# Patient Record
Sex: FEMALE | URBAN - METROPOLITAN AREA
[De-identification: names, ages, dates, MRNs, and addresses within clinical notes are randomized per-mention and may not be internally consistent; named-entity substitution may affect disease eponyms.]

---

## 2021-05-30 ENCOUNTER — RECORDS - HEALTHEAST (OUTPATIENT)
Dept: ADMINISTRATIVE | Facility: CLINIC | Age: 61
End: 2021-05-30

## 2021-05-31 ENCOUNTER — RECORDS - HEALTHEAST (OUTPATIENT)
Dept: ADMINISTRATIVE | Facility: CLINIC | Age: 61
End: 2021-05-31

## 2024-12-16 ENCOUNTER — TRANSCRIBE ORDERS (OUTPATIENT)
Dept: OTHER | Age: 64
End: 2024-12-16

## 2024-12-16 DIAGNOSIS — G89.29 CHRONIC PAIN OF LEFT UPPER EXTREMITY: Primary | ICD-10-CM

## 2024-12-16 DIAGNOSIS — M79.602 CHRONIC PAIN OF LEFT UPPER EXTREMITY: Primary | ICD-10-CM

## 2024-12-17 ENCOUNTER — APPOINTMENT (OUTPATIENT)
Dept: INTERPRETER SERVICES | Facility: CLINIC | Age: 64
End: 2024-12-17
Payer: COMMERCIAL

## 2024-12-19 ENCOUNTER — THERAPY VISIT (OUTPATIENT)
Dept: PHYSICAL THERAPY | Facility: REHABILITATION | Age: 64
End: 2024-12-19
Payer: COMMERCIAL

## 2024-12-19 DIAGNOSIS — M25.512 ACUTE PAIN OF LEFT SHOULDER: Primary | ICD-10-CM

## 2024-12-19 DIAGNOSIS — M79.602 CHRONIC PAIN OF LEFT UPPER EXTREMITY: ICD-10-CM

## 2024-12-19 DIAGNOSIS — G89.29 CHRONIC PAIN OF LEFT UPPER EXTREMITY: ICD-10-CM

## 2024-12-19 ASSESSMENT — ACTIVITIES OF DAILY LIVING (ADL)
AT_ITS_WORST?: 7
REMOVING_SOMETHING_FROM_YOUR_BACK_POCKET: 3
PUTTING_ON_A_SHIRT_THAT_BUTTONS_DOWN_THE_FRONT: 6
PUTTING_ON_YOUR_PANTS: 0
CARRYING_A_HEAVY_OBJECT_OF_10_POUNDS: 7
TOUCHING_THE_BACK_OF_YOUR_NECK: 8
PLEASE_INDICATE_YOR_PRIMARY_REASON_FOR_REFERRAL_TO_THERAPY:: SHOULDER
WASHING_YOUR_HAIR?: 0
WASHING_YOUR_BACK: 4
PUSHING_WITH_THE_INVOLVED_ARM: 4
REACHING_FOR_SOMETHING_ON_A_HIGH_SHELF: 8
WHEN_LYING_ON_THE_INVOLVED_SIDE: 8
PUTTING_ON_AN_UNDERSHIRT_OR_A_PULLOVER_SWEATER: 6
PLACING_AN_OBJECT_ON_A_HIGH_SHELF: 7

## 2024-12-19 NOTE — PROGRESS NOTES
PHYSICAL THERAPY EVALUATION  Type of Visit: Evaluation       Fall Risk Screen:  Have you fallen 2 or more times in the past year?: (Patient-Rptd) No  Have you fallen and had an injury in the past year?: (Patient-Rptd) No  Is patient a fall risk?: No    Subjective         Presenting condition or subjective complaint: (Patient-Rptd) shoulder  Date of onset: 26    Relevant medical history:     Dates & types of surgery: (Patient-Rptd) cyst in groin and breast 2005    Prior diagnostic imaging/testing results:       Prior therapy history for the same diagnosis, illness or injury: (Patient-Rptd) Yes      Patient reports that her pain has been there for a while. Sometimes, it would feel better. 1.5 months ago, her pain started to get worse and worse. She was prescribed medications and a gel. The pills help with pain, but the pain is still there.  Pain is in the shoulder and down and into her chest when she moves her shoulder. She has not been working the last 3 months. In [place], she was cleaning houses for work. She was also working at [employer].    Prior Level of Function  Transfers: Independent  Ambulation: Independent  ADL: Independent  IADL: Driving, Finances, Housekeeping, Laundry, Meal preparation, Medication management    Living Environment  Social support: (Patient-Rptd) With family members   Type of home: (Patient-Rptd) House   Stairs to enter the home:         Ramp: (Patient-Rptd) Yes   Stairs inside the home: (Patient-Rptd) No       Help at home: (Patient-Rptd) None  Equipment owned:       Employment: (Patient-Rptd) No    Hobbies/Interests: (Patient-Rptd) cook    Patient goals for therapy: (Patient-Rptd) recouperation    Pain assessment: Pain present  Location: L shoulder/Ratin/10     Objective   SHOULDER EVALUATION  PAIN: Pain Level at Rest: 4/10  Pain Level with Use: 7/10  Pain Location: shoulder and left side  Pain Quality: bothers her to not be able to move her arm  Pain Frequency: constant or  variable levels of pain  Pain is Worst: nighttime  Pain is Exacerbated By: Lying on her L side, lifting her arm, carrying/lifting objects if arm is twisted  Pain is Relieved By: NSAIDs, otc medications, and rest  Pain Progression: Worsened  POSTURE: Sitting Posture: Rounded shoulders, Forward head, Lordosis increased, Thoracic kyphosis decreased  ROM:   (Degrees) Left AROM Left PROM Right AROM  Right PROM   Shoulder Flexion 147 deg with pain  WNL    Shoulder Extension       Shoulder Abduction Pain to 90 degrees seated, 75 deg supine  WNL    Shoulder Adduction       Shoulder Internal Rotation Pain but able to get to L1, 48 deg at 90 deg abduction  WNL    Shoulder External Rotation 55 with pain at 90 deg abduction  WNL    Shoulder Horizontal Abduction       Shoulder Horizontal Adduction       Shoulder Flexion ER       Shoulder Flexion IR       Elbow Extension       Elbow Flexion       Pain:   End feel:   STRENGTH:   Pain: - none + mild ++ moderate +++ severe  Strength Scale: 0-5/5 Left Right   Shoulder Flexion 4, ++ (mod) 4+, - (none)   Shoulder Extension     Shoulder Abduction +++ (severe) 4+, - (none)   Shoulder Adduction     Shoulder Internal Rotation 4+, - (none) 4+, - (none)   Shoulder External Rotation 4, +++ (severe) 4+, - (none)   Shoulder Horizontal Abduction     Shoulder Horizontal Adduction     Elbow Flexion 4+, ++ (mod) 4+, - (none)   Elbow Extension 4+, + (mild) 4+, - (none)   Mid Trap     Lower Trap     Rhomboid     Serratus Anterior       SPECIAL TESTS:    Left Right   Impingement     Neer's Positive    Hawkin's-James Positive    Coracoid Impingement     Internal impingement     Labral     Anterior Slide     Osceola's     Crank     Instability     Apprehension (anterior)     Relocation (anterior)     Anterior Load & Shift     Posterior Load & Shift     Posterior instability (with 90 degrees flex)     Multi-Directional Instability      Sulcus     Biceps      Speed's     Rotator Cuff Tear     Drop Arm      Belly Press     Lift off        PALPATION:  Tenderness throughout shoulder complex. Pain at subacromial space and bicep tendon.  CERVICAL SCREEN: WNL    Assessment & Plan   CLINICAL IMPRESSIONS  Medical Diagnosis: M79.602, G89.29 (ICD-10-CM) - Chronic pain of left upper extremity    Treatment Diagnosis: L shoulder impingement/tendonitis   Impression/Assessment: Patient is a 64 year old female with complaints of L shoulder pain.  The following significant findings have been identified: Pain, Decreased ROM/flexibility, Decreased strength, and Impaired posture. These impairments interfere with their ability to perform self care tasks, recreational activities, and household chores as compared to previous level of function. Her symptoms are most consistent with impingement syndrome and tendonitis of the RC and bicep. She is appropriate for skilled 1:1 PT services at this time.    Clinical Decision Making (Complexity):  Clinical Presentation: Stable/Uncomplicated  Clinical Presentation Rationale: based on medical and personal factors listed in PT evaluation  Clinical Decision Making (Complexity): Low complexity    PLAN OF CARE  Treatment Interventions:  Interventions: Manual Therapy, Neuromuscular Re-education, Therapeutic Activity, Therapeutic Exercise, Self-Care/Home Management    Long Term Goals     PT Goal 1  Goal Identifier: Self-management/HEP  Goal Description: Patient will be independent in self-management of condition and HEP to reach functional goals.  Target Date: 03/13/25  PT Goal 2  Goal Identifier: Sleeping  Goal Description: Patient will be able to sleep throughout the night without waking d/t pain in the shoulder in order to improve QOL.  Target Date: 03/13/25  PT Goal 3  Goal Identifier: Housework  Goal Description: Patient will be able to perform housework without pain in the shoulder in order to improve home management.  Target Date: 03/13/25  PT Goal 4  Goal Identifier: Reaching  Goal Description:  Patient will be able to reach into her cupboard to put dishes away without pain in order to return to PLOF  Target Date: 03/13/25      Frequency of Treatment: 1x/week to every other week  Duration of Treatment: 12 weeks    Recommended Referrals to Other Professionals:  None  Education Assessment:   Learner/Method: Patient;Listening;Demonstration    Risks and benefits of evaluation/treatment have been explained.   Patient/Family/caregiver agrees with Plan of Care.     Evaluation Time:     PT Eval, Low Complexity Minutes (07774): 45   Present: Yes: Language: Nepali, ID Number/Identifier: [id number]     [facility]                                                                                   OUTPATIENT PHYSICAL THERAPY    PLAN OF TREATMENT FOR OUTPATIENT REHABILITATION   Patient's Last Name, First Name, Mariluz Sherwood    YOB: 1960   Provider's Name   [facility]   Medical Record No.  [id number]     Onset Date: 11/01/24  Start of Care Date: 12/19/24     Medical Diagnosis:  M79.602, G89.29 (ICD-10-CM) - Chronic pain of left upper extremity      PT Treatment Diagnosis:  L shoulder impingement/tendonitis Plan of Treatment  Frequency/Duration: 1x/week to every other week/ 12 weeks    Certification date from 03/13/25 to 03/13/25         See note for plan of treatment details and functional goals     Mel Cantu PT, DPT, MHA                         I CERTIFY THE NEED FOR THESE SERVICES FURNISHED UNDER        THIS PLAN OF TREATMENT AND WHILE UNDER MY CARE     (Physician attestation of this document indicates review and certification of the therapy plan).              Referring Provider:  Elvis Servin    Initial Assessment  See Epic Evaluation- Start of Care Date: 12/19/24         Signing Clinician: Mel Cantu PT, DPT, MHA

## 2024-12-23 ENCOUNTER — THERAPY VISIT (OUTPATIENT)
Dept: PHYSICAL THERAPY | Facility: REHABILITATION | Age: 64
End: 2024-12-23
Payer: COMMERCIAL

## 2024-12-23 DIAGNOSIS — M25.512 ACUTE PAIN OF LEFT SHOULDER: ICD-10-CM

## 2024-12-23 DIAGNOSIS — G89.29 CHRONIC PAIN OF LEFT UPPER EXTREMITY: Primary | ICD-10-CM

## 2024-12-23 DIAGNOSIS — M79.602 CHRONIC PAIN OF LEFT UPPER EXTREMITY: Primary | ICD-10-CM

## 2024-12-23 DIAGNOSIS — M62.81 GENERALIZED MUSCLE WEAKNESS: ICD-10-CM

## 2024-12-23 PROCEDURE — 97110 THERAPEUTIC EXERCISES: CPT | Mod: CQ | Performed by: PHYSICAL THERAPY ASSISTANT

## 2025-01-02 ENCOUNTER — THERAPY VISIT (OUTPATIENT)
Dept: PHYSICAL THERAPY | Facility: REHABILITATION | Age: 65
End: 2025-01-02
Payer: COMMERCIAL

## 2025-01-02 DIAGNOSIS — G89.29 CHRONIC PAIN OF LEFT UPPER EXTREMITY: Primary | ICD-10-CM

## 2025-01-02 DIAGNOSIS — M25.512 ACUTE PAIN OF LEFT SHOULDER: ICD-10-CM

## 2025-01-02 DIAGNOSIS — M79.602 CHRONIC PAIN OF LEFT UPPER EXTREMITY: Primary | ICD-10-CM

## 2025-01-13 ENCOUNTER — THERAPY VISIT (OUTPATIENT)
Dept: PHYSICAL THERAPY | Facility: REHABILITATION | Age: 65
End: 2025-01-13
Payer: COMMERCIAL

## 2025-01-13 DIAGNOSIS — M25.512 ACUTE PAIN OF LEFT SHOULDER: ICD-10-CM

## 2025-01-13 DIAGNOSIS — M62.81 GENERALIZED MUSCLE WEAKNESS: ICD-10-CM

## 2025-01-13 DIAGNOSIS — M79.602 CHRONIC PAIN OF LEFT UPPER EXTREMITY: Primary | ICD-10-CM

## 2025-01-13 DIAGNOSIS — G89.29 CHRONIC PAIN OF LEFT UPPER EXTREMITY: Primary | ICD-10-CM

## 2025-01-13 PROCEDURE — 97110 THERAPEUTIC EXERCISES: CPT | Mod: CQ | Performed by: PHYSICAL THERAPY ASSISTANT

## 2025-01-20 ENCOUNTER — TELEPHONE (OUTPATIENT)
Dept: PHYSICAL THERAPY | Facility: REHABILITATION | Age: 65
End: 2025-01-20

## 2025-01-20 NOTE — TELEPHONE ENCOUNTER
I left a message for Mariluz via the  line regarding today's no show. (NS #1) I reminded her of her next appointment on 1/27 @12:45.   Tiara Spicer PTA,CLT

## 2025-01-27 ENCOUNTER — THERAPY VISIT (OUTPATIENT)
Dept: PHYSICAL THERAPY | Facility: REHABILITATION | Age: 65
End: 2025-01-27
Payer: COMMERCIAL

## 2025-01-27 DIAGNOSIS — M25.512 ACUTE PAIN OF LEFT SHOULDER: ICD-10-CM

## 2025-01-27 DIAGNOSIS — M62.81 GENERALIZED MUSCLE WEAKNESS: ICD-10-CM

## 2025-01-27 DIAGNOSIS — G89.29 CHRONIC PAIN OF LEFT UPPER EXTREMITY: Primary | ICD-10-CM

## 2025-01-27 DIAGNOSIS — M79.602 CHRONIC PAIN OF LEFT UPPER EXTREMITY: Primary | ICD-10-CM

## 2025-01-27 PROCEDURE — 97110 THERAPEUTIC EXERCISES: CPT | Mod: GP | Performed by: PHYSICAL THERAPY ASSISTANT
